# Patient Record
Sex: FEMALE | Race: BLACK OR AFRICAN AMERICAN | URBAN - METROPOLITAN AREA
[De-identification: names, ages, dates, MRNs, and addresses within clinical notes are randomized per-mention and may not be internally consistent; named-entity substitution may affect disease eponyms.]

---

## 2024-07-28 ENCOUNTER — CARE COORDINATION (OUTPATIENT)
Dept: DERMATOLOGY | Facility: CLINIC | Age: 24
End: 2024-07-28
Payer: COMMERCIAL

## 2024-07-28 NOTE — PROGRESS NOTES
Contacted by Patient placement about a potential transfer from [facility] ED for patient with history of pruritic eruption on the face, trunk and extremities. No mucosal lesions. Patient with history of ulcerative colitis, but had been on only propranolol prior to the onset of rash on 7/25. Patient had no lip/tongue swelling, no respiratory concerns. VS normal except for tachycardia which patient has at baseline.     Patient had received epi pen injection, just for the rash yesterday. Two days prior she had also been started on a Medrol dosepack, oral benadryl, and famotide, but rash persisted so she presented again to the ED. Labs tonight showed a Ua with large LE, WBCs. BMP unremarkable, CBC with WBC of 15.8, but had been receiving systemic steroids.     Photos reviewed showing urticaria on the face and extremities.     As patient was otherwise well, I recommended treatment of UTI (infection is a leading cause of urticaria), continue antihistamines, and visit with dermatology clinic tomorrow. I have placed an appt request to the [facility] derm triage team, and the clinic phone number was provided to the ED physician.

## 2024-07-29 ENCOUNTER — DOCUMENTATION ONLY (OUTPATIENT)
Dept: DERMATOLOGY | Facility: CLINIC | Age: 24
End: 2024-07-29
Payer: COMMERCIAL

## 2024-07-29 ENCOUNTER — TELEPHONE (OUTPATIENT)
Dept: DERMATOLOGY | Facility: CLINIC | Age: 24
End: 2024-07-29
Payer: COMMERCIAL

## 2024-07-29 ENCOUNTER — OFFICE VISIT (OUTPATIENT)
Dept: DERMATOLOGY | Facility: CLINIC | Age: 24
End: 2024-07-29
Payer: COMMERCIAL

## 2024-07-29 VITALS
SYSTOLIC BLOOD PRESSURE: 113 MMHG | HEIGHT: 61 IN | BODY MASS INDEX: 27.1 KG/M2 | DIASTOLIC BLOOD PRESSURE: 77 MMHG | WEIGHT: 143.52 LBS | HEART RATE: 120 BPM

## 2024-07-29 DIAGNOSIS — L30.9 DERMATITIS: ICD-10-CM

## 2024-07-29 DIAGNOSIS — L50.9 URTICARIA: Primary | ICD-10-CM

## 2024-07-29 DIAGNOSIS — K51.018 ULCERATIVE PANCOLITIS WITH OTHER COMPLICATION (H): ICD-10-CM

## 2024-07-29 DIAGNOSIS — L29.9 PRURITUS: ICD-10-CM

## 2024-07-29 PROCEDURE — 88350 IMFLUOR EA ADDL 1ANTB STN PX: CPT | Mod: TC | Performed by: DERMATOLOGY

## 2024-07-29 PROCEDURE — 11105 PUNCH BX SKIN EA SEP/ADDL: CPT | Performed by: DERMATOLOGY

## 2024-07-29 PROCEDURE — 11104 PUNCH BX SKIN SINGLE LESION: CPT | Performed by: DERMATOLOGY

## 2024-07-29 PROCEDURE — 88305 TISSUE EXAM BY PATHOLOGIST: CPT | Mod: 26 | Performed by: DERMATOLOGY

## 2024-07-29 PROCEDURE — 88312 SPECIAL STAINS GROUP 1: CPT | Mod: TC | Performed by: DERMATOLOGY

## 2024-07-29 PROCEDURE — 99204 OFFICE O/P NEW MOD 45 MIN: CPT | Mod: 25 | Performed by: DERMATOLOGY

## 2024-07-29 PROCEDURE — 88350 IMFLUOR EA ADDL 1ANTB STN PX: CPT | Mod: 26 | Performed by: DERMATOLOGY

## 2024-07-29 PROCEDURE — 88312 SPECIAL STAINS GROUP 1: CPT | Mod: 26 | Performed by: DERMATOLOGY

## 2024-07-29 PROCEDURE — 88313 SPECIAL STAINS GROUP 2: CPT | Mod: 26 | Performed by: DERMATOLOGY

## 2024-07-29 PROCEDURE — G0463 HOSPITAL OUTPT CLINIC VISIT: HCPCS | Performed by: DERMATOLOGY

## 2024-07-29 PROCEDURE — 88346 IMFLUOR 1ST 1ANTB STAIN PX: CPT | Mod: 26 | Performed by: DERMATOLOGY

## 2024-07-29 RX ORDER — FAMOTIDINE 20 MG/1
TABLET, FILM COATED ORAL
Qty: 60 TABLET | Refills: 2 | Status: SHIPPED | OUTPATIENT
Start: 2024-07-29

## 2024-07-29 RX ORDER — CETIRIZINE HYDROCHLORIDE 10 MG/1
TABLET ORAL
Qty: 60 TABLET | Refills: 2 | Status: SHIPPED | OUTPATIENT
Start: 2024-07-29

## 2024-07-29 RX ORDER — MONTELUKAST SODIUM 10 MG/1
TABLET ORAL
Qty: 30 TABLET | Refills: 2 | Status: SHIPPED | OUTPATIENT
Start: 2024-07-29

## 2024-07-29 RX ORDER — MEDROXYPROGESTERONE ACETATE 150 MG/ML
150 INJECTION, SUSPENSION INTRAMUSCULAR
COMMUNITY
Start: 2023-01-12

## 2024-07-29 RX ORDER — HYDROXYZINE HYDROCHLORIDE 25 MG/1
TABLET, FILM COATED ORAL
Qty: 30 TABLET | Refills: 1 | Status: SHIPPED | OUTPATIENT
Start: 2024-07-29

## 2024-07-29 RX ORDER — MUPIROCIN 20 MG/G
OINTMENT TOPICAL
COMMUNITY
Start: 2024-02-06

## 2024-07-29 RX ORDER — DIPHENHYDRAMINE HCL 25 MG
1 TABLET ORAL EVERY 6 HOURS PRN
COMMUNITY

## 2024-07-29 RX ORDER — FAMOTIDINE 20 MG/1
20 TABLET, FILM COATED ORAL
COMMUNITY
End: 2024-07-29

## 2024-07-29 RX ORDER — EPINEPHRINE 0.3 MG/.3ML
0.3 INJECTION SUBCUTANEOUS
COMMUNITY
Start: 2024-07-27

## 2024-07-29 RX ORDER — CETIRIZINE HYDROCHLORIDE 10 MG/1
1 TABLET ORAL DAILY
COMMUNITY
Start: 2024-07-24 | End: 2024-07-29

## 2024-07-29 ASSESSMENT — PAIN SCALES - GENERAL: PAINLEVEL: EXTREME PAIN (9)

## 2024-07-29 NOTE — PROGRESS NOTES
"Pediatric Dermatology New Patient Visit  Dermatology Problem List:  # Generalized urticarial papules and plaques with resultant hyperpigmentation  - ddx: urticaria vs urticarial vasculitis vs other  - s/p punch biopsy, R upper arm 7/29/24 (H&E and DIF), results pending  - started on cefdinir since 7/28 for UTI (as per urinalysis)  - Current tx: cetirizine 10 mg BID, famotidine 20 mg BID, Singulair 10 mg daily, hydroxyzine 25 mg at bedtime  - Previous tx: oral prednisone (7/23-7/28), benadryl, epinephrine (ED x2)    CC: RECHECK    HPI:  Jordin Dykes is a(n) 23 year old female who presents today as a new patient for evaluation of a rash.  With father who is an independent historian.    - Jordin reports experiencing a generalized itchy rash (\"like hives\"), most prominent on the lower extremities, which has been persistently present for the past 6 days. She believes that her rash has been moving around her skin within a period of 24 hours. Significantly worsened over this past weekend, with notable facial swelling and a sore throat - per chart review, she presented to [facility] ED twice during this time, at which point she was given benadryl and epinephrine during both visits without significant improvement noted. She was started on cefdinir yesterday for a UTI (per patient, she denies having any urinary tract symptoms, though she states that bacteria was noted in her urine).    - Prior to rash onset, she states that she was otherwise feeling well - denies experiencing any GI symptoms, urinary symptoms, vaginal discharge/itching, or respiratory symptoms. Patient denies experiencing any shortness of breath or tongue swelling.   - Jordin underwent a colectomy 2 months ago for ulcerative colitis - she completed a post-operative course of prednisone ~7/5 or 7/6. She is currently not on any medications for her ulcerative colitis. Reports being very stressed with her new ostomy bag and the recent changes " to her health.  - She saw her PCP last Tuesday in clinic on the date of symptom onset - they stopped her propranolol as a result (she had started propranolol 2 weeks prior to symptom onset for management of anxiety and an elevated heart rate) and gave her prednisone (which she took from 7/23-7/28). She denies starting any other new medications over the past 1-2 months, including over the counter medications and antibiotics. Reports being sexually active, states that recent STI testing was negative.    ROS: 12-point review of systems performed and negative, except for as per HPI.    Allergies: azathioprine    Family History: Father with history of urticaria.    Past Medical/Surgical History:   There is no problem list on file for this patient.    No past medical history on file.  No past surgical history on file.    Medications:  No current outpatient medications on file.     No current facility-administered medications for this visit.     Labs/Imaging:  UA, CBC , and BMP  reviewed.    Physical Exam:  Vitals: There were no vitals taken for this visit.  SKIN: Total skin excluding the undergarment areas was performed. The exam included the head/face, neck, both arms, chest, back, abdomen, both legs, digits and/or nails.   - Scattered throughout the cheeks, chest, abdomen, upper back, arms, and legs, there are numerous light pink papular wheals coalescing into plaques. Resultant tan hyperpigmented patches on the bilateral forearms.  - No other lesions of concern on areas examined.                          Assessment & Plan:    1. Generalized urticarial papules and plaques with resultant hyperpigmentation  History and exam are clinically most consistent with acute urticaria, though with the residual hyperpigmentation on her forearms, want to rule out the possibility of urticarial vasculitis. Recommend punch biopsy today (for H&E and DIF) to further assess this possibility, risks/benefits discussed and patient is  agreeable with plan.  - s/p punch biopsy, see procedure note below  - start cetirizine 10 mg BID  - start famotidine 20 mg BID  - start Singulair 10 mg daily  - start hydroxyzine 25 mg at bedtime    Procedures: Punch biopsy procedure note: After discussion with patient or guardian on benefits and risks including but not limited to, bleeding, infection, scar, incomplete removal, recurrence, and non-diagnostic biopsy, written consent and photographs were obtained. The area was cleaned with isopropyl alcohol. 3 mL of 1% lidocaine with epinephrine was injected to obtain adequate anesthesia of 2 lesion(s) on the R upper arm. 4 mm punch biopsy performed at site(s). 4-0 Prolene sutures were utilized to approximate the epidermal edges. White petrolatum ointment and a bandage was applied to the wound. Explicit verbal and written wound care instructions were provided. The patient left the dermatology clinic in good condition.    Follow-up pending biopsy results.  Nikole Simental MD  PGY4 Dermatology Resident    I have personally examined this patient and agree with the resident doctor's documentation and plan of care. I have reviewed and amended the resident's note above. The documentation accurately reflects my clinical observations, diagnoses, treatment and follow-up plans. I was present for key portions of the procedure.       Gertrudis Chapman MD  Dermatology Staff

## 2024-07-29 NOTE — PROGRESS NOTES
Called the patient with regards to follow up. Patient still endorses persistent pruritic rash on the body and particularly on the lower extremities. Patient denies any angioedema, throat closure, tongue swelling or eyelid swelling.    23 y.o female presented to  yesterday 7/28 for new onset rash. Pt broke out in hives three days ago and then her PCP stopped propranolol and given prednisone 40mg, benadryl, certiriziine and famotidinie     Yesterday rash appeared to be itchier and acutely worsening per the UC note. Initially slightly improved but then over the course of yesterday morning started to involve her back, neck as well as face and some associated sore throat. Patient then came into the ED to have the rash evaluated. Denies any angioedema, tongue swelling or difficulty breathing     UC concerned for anaphylactic reaction so given Epii pen and additional dose of steroids, benadryl     Vitals:   BP: 132/72, HR: 100, afebrile   WBC: 15.8, throat swab negative   UA: WBC 15-29, Bacterial present     A/P:   # Persistent pruritic urticarial rash in the setting of UTI and recent start of propranolol. Can consider urticarial rash 2/2 to UTI but also serum sickness like reaction due to propranolol start. Can also consider UV, patient did not endorse joint pain via telephone but difficulty to assess if the lesions have remained constant for greater than 24 hours and if persistent joint pain is present.  Regardless, reassuring that patient denies any anaphylactoid symptoms.   - Will plan on having the patient seen and evaluated by Dr. Chapman today 7/29/24 in Pediatric Clinic   - The team will reach out with regards to scheduling.   - Can consider Hydroxyzine 25mg q 6 hours PRN  - Can consider Triamcinolone 0.1% ointment BID PRN         Discussed with Dr. Chapman who will see and evaluate the patient today 7/29/24    Patient was seen in dermatology clinic today.     Gertrudis Chapman MD   of  Dermatology  [facility]

## 2024-07-29 NOTE — Clinical Note
Making an encounter and sending another message just as an FYI. Thank you so much for your help miroslava Mercado for the message overload.   Thank you!

## 2024-07-29 NOTE — CONFIDENTIAL NOTE
Called the patient with regards to follow up. Patient still endorses persistent pruritic rash on the body and particularly on the lower extremities. Patient denies any angioedema, throat closure, tongue swelling or eyelid swelling.    23 y.o female presented to  yesterday 7/28 for new onset rash. Pt broke out in hives three days ago and then her PCP stopped propranolol and given prednisone 40mg, benadryl, certiriziine and famotidinie     Yesterday rash appeared to be itchier and acutely worsening per the UC note. Initially slightly improved but then over the course of yesterday morning started to involve her back, neck as well as face and some associated sore throat. Patient then came into the ED to have the rash evaluated. Denies any angioedema, tongue swelling or difficulty breathing     UC concerned for anaphylactic reaction so given Epii pen and additional dose of steroids, benadryl     Vitals:   BP: 132/72, HR: 100, afebrile   WBC: 15.8, throat swab negative   UA: WBC 15-29, Bacterial present     A/P:   # Persistent pruritic urticarial rash in the setting of UTI and recent start of propranolol. Can consider urticarial rash 2/2 to UTI but also serum sickness like reaction due to propranolol start. Can also consider UV, patient did not endorse joint pain via telephone but difficulty to assess if the lesions have remained constant for greater than 24 hours and if persistent joint pain is present.  Regardless, reassuring that patient denies any anaphylactoid symptoms.   - Will plan on having the patient seen and evaluated by Dr. Chapman today 7/29/24 in Pediatric Clinic   - The team will reach out with regards to scheduling.   - Can consider Hydroxyzine 25mg q 6 hours PRN  - Can consider Triamcinolone 0.1% ointment BID PRN       Discussed with Dr. Chapman who will see and evaluate the patient today 7/29/24    Sarina Gracia MD  Dermatology Rseident PGY-4  Pager: [phone]

## 2024-07-29 NOTE — NURSING NOTE
"[facility] [[id number]]  Chief Complaint   Patient presents with    Consult     Consult     Initial /77   Pulse 120   Ht 5' 1.34\" (155.8 cm)   Wt 143 lb 8.3 oz (65.1 kg)   BMI 26.82 kg/m   Estimated body mass index is 26.82 kg/m  as calculated from the following:    Height as of this encounter: 5' 1.34\" (155.8 cm).    Weight as of this encounter: 143 lb 8.3 oz (65.1 kg).  Medication Reconciliation: complete    Does the patient need any medication refills today? No    Does the patient/parent need MyChart or Proxy acces today? Yes    Shy Armstrong, EMT                "

## 2024-07-29 NOTE — TELEPHONE ENCOUNTER
----- Message from Gertrudis Chapman sent at 7/28/2024  5:28 PM CDT -----  Regarding: Urgent Appointment needed on Monday 7/29  Hi [facility] team,     Can we please add this patient on to clinic tomorrow? She presented to [facility] ED x2 this weekend for severe urticaria. She had a UTI which may be the trigger, but needs additional assessment by derm. Sarina- if she can't be seen tomorrow in adult derm, please let me know and we can figure out next steps.  Thanks,   Gertrudis

## 2024-07-29 NOTE — PATIENT INSTRUCTIONS
Wound Care After a Biopsy    What is a skin biopsy?  A skin biopsy allows the doctor to examine a very small piece of tissue under the microscope to determine the diagnosis and the best treatment for the skin condition. A local anesthetic (numbing medicine) is injected with a very small needle into the skin area to be tested. A small piece of skin is taken from the area. Sometimes a suture (stitch) is used.     What are the risks of a skin biopsy?  I will experience scar, bleeding, swelling, pain, crusting and redness. I may experience incomplete removal or recurrence. Risks of this procedure are excessive bleeding, bruising, infection, nerve damage, numbness, thick (hypertrophic or keloidal) scar and non-diagnostic biopsy.    How should I care for my wound for the first 24 hours?  Keep the wound dry and covered for 24 hours  If it bleeds, hold direct pressure on the area for 15 minutes. If bleeding does not stop, call us or go to the emergency room  Avoid strenuous exercise the first 1-2 days or as your doctor instructs you    How should I care for the wound after 24 hours?  After 24 hours, remove the bandage  You may bathe or shower as normal  If you had a scalp biopsy, you can shampoo as usual and can use shower water to clean the biopsy site daily  Clean the wound once a day with gentle soap and water  Do not scrub, be gentle  Apply white petroleum/Vaseline after cleaning the wound with a cotton swab or a clean finger, and keep the site covered with a Bandaid /bandage. Bandages are not necessary with a scalp biopsy  If you are unable to cover the site with a Bandaid /bandage, re-apply ointment 2-3 times a day to keep the site moist. Moisture will help with healing  Avoid strenuous activity for first 1-2 days  Avoid lakes, rivers, pools, and oceans until the stitches are removed or the site is healed    How do I clean my wound?  Wash hands thoroughly with soap or use hand  before all wound care  Clean  the wound with gentle soap and water  Apply white petroleum/Vaseline  to wound after it is clean  Replace the Bandaid /bandage to keep the wound covered for the first few days or as instructed by your doctor  If you had a scalp biopsy, warm shower water to the area on a daily basis should suffice    What should I use to clean my wound?   Cotton-tipped applicators (Qtips )  White petroleum jelly (Vaseline ). Use a clean new container and use Q-tips to apply.  Bandaids  as needed  Gentle soap     How should I care for my wound long term?  Do not get your wound dirty  Keep up with wound care for one week or until the area is healed.  If you have stitches, stitches need to be removed in 14 days. You may return to our clinic for this or you may have it done locally at your doctor s office.  A small scab will form and fall off by itself when the area is completely healed. The area will be red and will become pink in color as it heals. Sun protection is very important for how your scar will turn out. Sunscreen with an SPF 30 or greater is recommended once the area is healed.  You should have some soreness but it should be mild and slowly go away over several days. Talk to your doctor about using tylenol for pain,    When should I call my doctor?  If you have increased:   Pain or swelling  Pus or drainage (clear or slightly yellow drainage is ok)  Temperature over 100F  Spreading redness or warmth around wound    When will I hear about my results?  The biopsy results can take 2 weeks to come back.  Your results will automatically release to [employer] before your provider has even reviewed them.  The clinic will call you with the results, send you a [employer] message, or have you schedule a follow-up clinic or phone time to discuss the results.  Contact our clinics if you do not hear from us in 2 weeks.    Who should I call with questions?  [facility]: [phone]  [facility]: [phone]  For urgent needs outside of business hours call the [facility] at [phone] and ask for the dermatology resident on call         [facility]- Pediatric Dermatology  Dr. Kemi Sosa, Dr. Anupam Granados, Dr. Gertrudis Chapman Dr., [place] Blue, [place]  Dr. Shayla Bales, & Dr. Marlin Zamarripa    Non Urgent  Nurse Triage Line: [phone], Janel RN Care Coordinators    Vascular Anomalies Clinic: [phone], Jess Care Coordinator     If you need a prescription refill, please contact your pharmacy. Refills are approved or denied by our Physicians during normal business hours, Monday through Fridays  Per office policy, refills will not be granted if you have not been seen within the past year (or sooner depending on your child's condition)      Scheduling Information:   Pediatric Appointment Scheduling and Call Center [phone]   Radiology Scheduling- [phone]   Sedation Unit Scheduling- [phone]  Main  Services: [phone]   Albanian: [phone]   Japanese: [phone]   Hmong/Mongolian/James: [phone]    Preadmission Nursing Department Fax Number: [phone] (Fax all pre-operative paperwork to this number)      For urgent matters arising during evenings, weekends, or holidays that cannot wait for normal business hours please call [phone] and ask for the Dermatology Resident On-Call to be paged.

## 2024-07-30 ENCOUNTER — TELEPHONE (OUTPATIENT)
Dept: DERMATOLOGY | Facility: CLINIC | Age: 24
End: 2024-07-30

## 2024-07-30 DIAGNOSIS — L50.9 URTICARIA: Primary | ICD-10-CM

## 2024-07-30 PROBLEM — K51.018 ULCERATIVE PANCOLITIS WITH OTHER COMPLICATION (H): Status: ACTIVE | Noted: 2024-07-30

## 2024-07-30 RX ORDER — PREDNISONE 10 MG/1
TABLET ORAL
Qty: 74 TABLET | Refills: 0 | Status: SHIPPED | OUTPATIENT
Start: 2024-07-30

## 2024-07-30 NOTE — TELEPHONE ENCOUNTER
Patient seen in [facility] derm clinic yesterday. Jess- can you please call the patient and let her know that she should continue to take all antihistamines prescribed and that I have sent a prednisone taper to start as well to her pharmacy?  Thanks,   Gertrudis

## 2024-07-30 NOTE — TELEPHONE ENCOUNTER
[facility] Call Center    Phone Message    May a detailed message be left on voicemail: yes     Reason for Call: Patient saw Dr Lindquist yesterday - meds aren't helping - patient still getting hives - no swelling of mouth or tongue - wants to talk to a nurse - please call back [phone] Thank you    Action Taken: Message routed to:  [facility] ([facility] Derm    Travel Screening: Not Applicable     Date of Service:

## 2024-07-30 NOTE — CONFIDENTIAL NOTE
Called and spoke with patient. Informed her that Dr. Chapman would like her to continue to take all antihistamines that were prescribed. She also would like to start her on a Prednisone taper. Discussed dosing and pharmacy location/.  Patient had no other questions and in agreement with this plan.  She will contact clinic if she has any concerns or questions.    She Samson RN     gradual onset

## 2024-08-06 LAB
PATH REPORT.COMMENTS IMP SPEC: NORMAL
PATH REPORT.FINAL DX SPEC: NORMAL
PATH REPORT.GROSS SPEC: NORMAL
PATH REPORT.MICROSCOPIC SPEC OTHER STN: NORMAL
PATH REPORT.RELEVANT HX SPEC: NORMAL

## 2024-10-06 ENCOUNTER — HEALTH MAINTENANCE LETTER (OUTPATIENT)
Age: 24
End: 2024-10-06